# Patient Record
Sex: FEMALE | Race: WHITE
[De-identification: names, ages, dates, MRNs, and addresses within clinical notes are randomized per-mention and may not be internally consistent; named-entity substitution may affect disease eponyms.]

---

## 2018-08-31 ENCOUNTER — HOSPITAL ENCOUNTER (EMERGENCY)
Dept: HOSPITAL 54 - ER | Age: 43
Discharge: HOME | End: 2018-08-31
Payer: SELF-PAY

## 2018-08-31 VITALS
HEIGHT: 62 IN | WEIGHT: 110 LBS | BODY MASS INDEX: 20.24 KG/M2 | DIASTOLIC BLOOD PRESSURE: 75 MMHG | SYSTOLIC BLOOD PRESSURE: 142 MMHG

## 2018-08-31 DIAGNOSIS — N39.0: Primary | ICD-10-CM

## 2018-08-31 PROCEDURE — Z7610: HCPCS

## 2018-08-31 PROCEDURE — A4606 OXYGEN PROBE USED W OXIMETER: HCPCS

## 2018-08-31 NOTE — NUR
URINE SAMPLE OBTAINED. VS STABLE. /78. HR 89. R 18. O2 SAT 98% ON RA. Pt 
WAITING COMFORTABLY IN BED. NO S/S OF ACUTE DISTRESS OR SOB NOTED.

## 2020-07-27 ENCOUNTER — HOSPITAL ENCOUNTER (INPATIENT)
Dept: HOSPITAL 54 - ER | Age: 45
LOS: 3 days | Discharge: HOME | DRG: 233 | End: 2020-07-30
Attending: NURSE PRACTITIONER | Admitting: NURSE PRACTITIONER
Payer: COMMERCIAL

## 2020-07-27 VITALS — SYSTOLIC BLOOD PRESSURE: 117 MMHG | DIASTOLIC BLOOD PRESSURE: 70 MMHG

## 2020-07-27 VITALS — HEIGHT: 60 IN | WEIGHT: 122 LBS | BODY MASS INDEX: 23.95 KG/M2

## 2020-07-27 VITALS — DIASTOLIC BLOOD PRESSURE: 70 MMHG | SYSTOLIC BLOOD PRESSURE: 117 MMHG

## 2020-07-27 DIAGNOSIS — M32.9: ICD-10-CM

## 2020-07-27 DIAGNOSIS — K38.1: ICD-10-CM

## 2020-07-27 DIAGNOSIS — K35.32: Primary | ICD-10-CM

## 2020-07-27 DIAGNOSIS — M79.7: ICD-10-CM

## 2020-07-27 DIAGNOSIS — D25.9: ICD-10-CM

## 2020-07-27 DIAGNOSIS — N83.00: ICD-10-CM

## 2020-07-27 DIAGNOSIS — F17.200: ICD-10-CM

## 2020-07-27 LAB
ALBUMIN SERPL BCP-MCNC: 4 G/DL (ref 3.4–5)
ALP SERPL-CCNC: 70 U/L (ref 46–116)
ALT SERPL W P-5'-P-CCNC: 18 U/L (ref 12–78)
AST SERPL W P-5'-P-CCNC: 15 U/L (ref 15–37)
BASOPHILS # BLD AUTO: 0.1 /CMM (ref 0–0.2)
BASOPHILS NFR BLD AUTO: 0.4 % (ref 0–2)
BILIRUB DIRECT SERPL-MCNC: 0.1 MG/DL (ref 0–0.2)
BILIRUB SERPL-MCNC: 0.5 MG/DL (ref 0.2–1)
BILIRUB UR QL STRIP: (no result)
BUN SERPL-MCNC: 16 MG/DL (ref 7–18)
CALCIUM SERPL-MCNC: 8.8 MG/DL (ref 8.5–10.1)
CHLORIDE SERPL-SCNC: 100 MMOL/L (ref 98–107)
CO2 SERPL-SCNC: 26 MMOL/L (ref 21–32)
CREAT SERPL-MCNC: 0.7 MG/DL (ref 0.6–1.3)
EOSINOPHIL NFR BLD AUTO: 0.6 % (ref 0–6)
GLUCOSE SERPL-MCNC: 99 MG/DL (ref 74–106)
HCT VFR BLD AUTO: 41 % (ref 33–45)
HGB BLD-MCNC: 13.5 G/DL (ref 11.5–14.8)
KETONES UR STRIP-MCNC: 80 MG/DL
LIPASE SERPL-CCNC: 104 U/L (ref 73–393)
LYMPHOCYTES NFR BLD AUTO: 1.5 /CMM (ref 0.8–4.8)
LYMPHOCYTES NFR BLD AUTO: 10.9 % (ref 20–44)
MCHC RBC AUTO-ENTMCNC: 33 G/DL (ref 31–36)
MCV RBC AUTO: 94 FL (ref 82–100)
MONOCYTES NFR BLD AUTO: 0.9 /CMM (ref 0.1–1.3)
MONOCYTES NFR BLD AUTO: 6.8 % (ref 2–12)
NEUTROPHILS # BLD AUTO: 10.9 /CMM (ref 1.8–8.9)
NEUTROPHILS NFR BLD AUTO: 81.3 % (ref 43–81)
PH UR STRIP: 6.5 [PH] (ref 5–8)
PLATELET # BLD AUTO: 248 /CMM (ref 150–450)
POTASSIUM SERPL-SCNC: 4.2 MMOL/L (ref 3.5–5.1)
PROT SERPL-MCNC: 7.1 G/DL (ref 6.4–8.2)
RBC # BLD AUTO: 4.38 MIL/UL (ref 4–5.2)
RBC #/AREA URNS HPF: (no result) /HPF (ref 0–2)
SODIUM SERPL-SCNC: 136 MMOL/L (ref 136–145)
UROBILINOGEN UR STRIP-MCNC: 0.2 EU/DL
WBC #/AREA URNS HPF: (no result) /HPF (ref 0–3)
WBC NRBC COR # BLD AUTO: 13.4 K/UL (ref 4.3–11)

## 2020-07-27 PROCEDURE — 0DTJ4ZZ RESECTION OF APPENDIX, PERCUTANEOUS ENDOSCOPIC APPROACH: ICD-10-PCS | Performed by: SURGERY

## 2020-07-27 PROCEDURE — G0378 HOSPITAL OBSERVATION PER HR: HCPCS

## 2020-07-27 RX ADMIN — SODIUM CHLORIDE PRN MLS/HR: 9 INJECTION, SOLUTION INTRAVENOUS at 23:36

## 2020-07-27 NOTE — NUR
MS RN ADMISSION NOTES



RECEIVED PATIENT FROM ER VIA [facility], ALERT AND ORIENTED X 4. AMBULATORY AND VERBALLY 
RESPONSIVE. ABLE TO FOLLOW DIRECTIONS. BREATHING REGULAR AND UNLABORED ON ROOM AIR. LEFT AC 
G18 IV LINE INTACT AND PATENT, FLUSHING WELL. BODY ASSESSMENT DONE, SKIN INTACT CLEAN AND 
DRY. VITAL SIGNS AND BELONGINGS CHECKED BY CNA. DENIES SUICIDAL IDEATION OR PAIN/DISCOMFORT 
AT THIS TIME. ADVISED ON NOTHING BY MOUTH. DOESN'T HAVE ADVANCE DIRECTIVES AND WISHED TO BE 
FULL CODE. BED LOW AND LOCKED ON SEMI FOWLERS POSITION. CALL LIGHT IN REACH. WILL CONTINUE 
TO MONITOR.

## 2020-07-27 NOTE — NUR
PT BEING TRANSPORTED TO UNIT ON [facility] WITH EMT AT BEDSIDE. PT IS IN STABLE 
CONDITION FOR TRASPORT. NAD NOTED

## 2020-07-28 VITALS — SYSTOLIC BLOOD PRESSURE: 93 MMHG | DIASTOLIC BLOOD PRESSURE: 57 MMHG

## 2020-07-28 VITALS — SYSTOLIC BLOOD PRESSURE: 123 MMHG | DIASTOLIC BLOOD PRESSURE: 71 MMHG

## 2020-07-28 VITALS — SYSTOLIC BLOOD PRESSURE: 114 MMHG | DIASTOLIC BLOOD PRESSURE: 72 MMHG

## 2020-07-28 VITALS — SYSTOLIC BLOOD PRESSURE: 105 MMHG | DIASTOLIC BLOOD PRESSURE: 64 MMHG

## 2020-07-28 LAB
BASOPHILS # BLD AUTO: 0 /CMM (ref 0–0.2)
BASOPHILS NFR BLD AUTO: 0.1 % (ref 0–2)
BUN SERPL-MCNC: 9 MG/DL (ref 7–18)
CALCIUM SERPL-MCNC: 8.5 MG/DL (ref 8.5–10.1)
CHLORIDE SERPL-SCNC: 103 MMOL/L (ref 98–107)
CHOLEST SERPL-MCNC: 192 MG/DL (ref ?–200)
CO2 SERPL-SCNC: 23 MMOL/L (ref 21–32)
CREAT SERPL-MCNC: 0.6 MG/DL (ref 0.6–1.3)
EOSINOPHIL NFR BLD AUTO: 0.1 % (ref 0–6)
GLUCOSE SERPL-MCNC: 111 MG/DL (ref 74–106)
HCT VFR BLD AUTO: 38 % (ref 33–45)
HDLC SERPL-MCNC: 84 MG/DL (ref 40–60)
HGB BLD-MCNC: 12.5 G/DL (ref 11.5–14.8)
LDLC SERPL DIRECT ASSAY-MCNC: 106 MG/DL (ref 0–99)
LYMPHOCYTES NFR BLD AUTO: 1 /CMM (ref 0.8–4.8)
LYMPHOCYTES NFR BLD AUTO: 7.2 % (ref 20–44)
MAGNESIUM SERPL-MCNC: 2.1 MG/DL (ref 1.8–2.4)
MCHC RBC AUTO-ENTMCNC: 33 G/DL (ref 31–36)
MCV RBC AUTO: 94 FL (ref 82–100)
MONOCYTES NFR BLD AUTO: 0.9 /CMM (ref 0.1–1.3)
MONOCYTES NFR BLD AUTO: 6.3 % (ref 2–12)
NEUTROPHILS # BLD AUTO: 12.5 /CMM (ref 1.8–8.9)
NEUTROPHILS NFR BLD AUTO: 86.3 % (ref 43–81)
PHOSPHATE SERPL-MCNC: 3.7 MG/DL (ref 2.5–4.9)
PLATELET # BLD AUTO: 199 /CMM (ref 150–450)
POTASSIUM SERPL-SCNC: 4.2 MMOL/L (ref 3.5–5.1)
RBC # BLD AUTO: 4.05 MIL/UL (ref 4–5.2)
SODIUM SERPL-SCNC: 136 MMOL/L (ref 136–145)
TRIGL SERPL-MCNC: 23 MG/DL (ref 30–150)
WBC NRBC COR # BLD AUTO: 14.5 K/UL (ref 4.3–11)

## 2020-07-28 RX ADMIN — PIPERACILLIN SODIUM AND TAZOBACTAM SODIUM SCH MLS/HR: .375; 3 INJECTION, POWDER, LYOPHILIZED, FOR SOLUTION INTRAVENOUS at 17:19

## 2020-07-28 RX ADMIN — HYDROMORPHONE HYDROCHLORIDE PRN MG: 1 INJECTION, SOLUTION INTRAMUSCULAR; INTRAVENOUS; SUBCUTANEOUS at 13:54

## 2020-07-28 RX ADMIN — SODIUM CHLORIDE PRN MLS/HR: 9 INJECTION, SOLUTION INTRAVENOUS at 10:44

## 2020-07-28 RX ADMIN — HYDROMORPHONE HYDROCHLORIDE PRN MG: 1 INJECTION, SOLUTION INTRAMUSCULAR; INTRAVENOUS; SUBCUTANEOUS at 09:43

## 2020-07-28 RX ADMIN — HYDROMORPHONE HYDROCHLORIDE PRN MG: 1 INJECTION, SOLUTION INTRAMUSCULAR; INTRAVENOUS; SUBCUTANEOUS at 04:46

## 2020-07-28 RX ADMIN — PIPERACILLIN SODIUM AND TAZOBACTAM SODIUM SCH MLS/HR: .375; 3 INJECTION, POWDER, LYOPHILIZED, FOR SOLUTION INTRAVENOUS at 10:50

## 2020-07-28 NOTE — NUR
RN OPENING NOTE

RECEIVED PT A/O X 4, PT SITTING UP IN BED WITH BLE DVT PUMPS ON. DENIES PAIN OR DISCOMFORT 
AT THIS TIME. IV TO LAC PATENT AND INTACT FLUSHING WELL. NS INFUSING  ML/HR. PT 
AMBULATED TO RESTROOM WITH NO ASSISTANCE, WILL CONTINUE TO MONITOR PT.

## 2020-07-28 NOTE — NUR
m/s lvn: notes



heather (recovery nurse) here to  the pt via bed with chart. zosyn ivpb given to heather 
(recovery nurse) to administer after surgery.

## 2020-07-28 NOTE — NUR
MS RN NOTES



COMPLAINED OF 8/10 ABDOMINAL PAIN, DILAUDID 1MG GIVEN IV PUSH. NON-PHARMACOLOGICAL 
INTERVENTIONS PROVIDED. VITAL SIGNS WNL. WILL CONTINUE TO MONITOR.

## 2020-07-28 NOTE — NUR
m/s lvn: notes



pt c/o n/v, medicated with zofran 4mg ivp by rn. pt remains npo and scheduled for surgery at 
1600.

## 2020-07-28 NOTE — NUR
m/s lvn: notes



received pt from recovery with dx: s/p lap appy and washout. incision to abdomen with 
dermabond. no drainage/discharge noted. no c/o sob or any discomfort at this time. iv fluids 
connected. clear liquid diet provided. hob elevated. needs attended. instructed to call for 
assistance. will continue to monitor.

## 2020-07-28 NOTE — NUR
m/s lvn: notes



c/o 8/10 abdominal pain, medicated with dilaudid 1mg ivp by rn. instructed to call for 
assistance. will continue to monitor.

## 2020-07-28 NOTE — NUR
m/s lvn: notes



c/o 10/10 abdominal pain, medicated with dilaudid 1mg ivp by rn. will continue to monitor.

## 2020-07-28 NOTE — NUR
m/s lvn: notes



ice pack and incentive spirometry provided with teachings, pt verbalized understanding.

## 2020-07-28 NOTE — NUR
m/s lvn: notes



consent obtain for  <Laparoscopic appendectomy possible open, possible any other indicated, 
procedure, anesthesia, and blood products. pt verbalized understanding of procedure. pt 
remains npo.

## 2020-07-28 NOTE — NUR
MS RN CLOSING NOTES



PATIENT IN BED ALERT AND ORIENTED X 4. AFEBRILE WITH NO S/S OF DISTRESS OBSERVED. LEFT AC 
G18 IV LINE PATENT AND INFUSING WELL. NO COMPLAINTS OF PAIN/DISCOMFORT REPORTED AT THIS 
TIME. MAINTAINED NPO. BED LOW AND LOCKED ON SEMI FOWLERS POSITION. CALL LIGHT IN REACH. WILL 
ENDORSE TO MORNING SHIFT FOR DINORA.

## 2020-07-28 NOTE — NUR
m/s lvn: surgeon consult



meri (np) at bedside and explained the procedure involve and risks, pt verbalized 
understanding. per np, she will put the order in. pt scheduled at 1600 as stated.

## 2020-07-28 NOTE — NUR
m/s lvn: initial assessment



received pt in bed awake, a/ox4. remains npo. iv fluids infusing. no c/o pain at this time. 
instructed to call for assistance. will continue to monitor.

## 2020-07-29 VITALS — SYSTOLIC BLOOD PRESSURE: 116 MMHG | DIASTOLIC BLOOD PRESSURE: 75 MMHG

## 2020-07-29 VITALS — SYSTOLIC BLOOD PRESSURE: 110 MMHG | DIASTOLIC BLOOD PRESSURE: 65 MMHG

## 2020-07-29 VITALS — DIASTOLIC BLOOD PRESSURE: 66 MMHG | SYSTOLIC BLOOD PRESSURE: 103 MMHG

## 2020-07-29 LAB
BASOPHILS # BLD AUTO: 0 /CMM (ref 0–0.2)
BASOPHILS NFR BLD AUTO: 0.3 % (ref 0–2)
BUN SERPL-MCNC: 5 MG/DL (ref 7–18)
CALCIUM SERPL-MCNC: 8.4 MG/DL (ref 8.5–10.1)
CHLORIDE SERPL-SCNC: 108 MMOL/L (ref 98–107)
CO2 SERPL-SCNC: 26 MMOL/L (ref 21–32)
CREAT SERPL-MCNC: 0.6 MG/DL (ref 0.6–1.3)
EOSINOPHIL NFR BLD AUTO: 1 % (ref 0–6)
GLUCOSE SERPL-MCNC: 102 MG/DL (ref 74–106)
HCT VFR BLD AUTO: 34 % (ref 33–45)
HGB BLD-MCNC: 11.1 G/DL (ref 11.5–14.8)
LYMPHOCYTES NFR BLD AUTO: 1.5 /CMM (ref 0.8–4.8)
LYMPHOCYTES NFR BLD AUTO: 22.5 % (ref 20–44)
MAGNESIUM SERPL-MCNC: 2.1 MG/DL (ref 1.8–2.4)
MCHC RBC AUTO-ENTMCNC: 33 G/DL (ref 31–36)
MCV RBC AUTO: 95 FL (ref 82–100)
MONOCYTES NFR BLD AUTO: 0.6 /CMM (ref 0.1–1.3)
MONOCYTES NFR BLD AUTO: 9.1 % (ref 2–12)
NEUTROPHILS # BLD AUTO: 4.6 /CMM (ref 1.8–8.9)
NEUTROPHILS NFR BLD AUTO: 67.1 % (ref 43–81)
PHOSPHATE SERPL-MCNC: 2.2 MG/DL (ref 2.5–4.9)
PLATELET # BLD AUTO: 170 /CMM (ref 150–450)
POTASSIUM SERPL-SCNC: 4.3 MMOL/L (ref 3.5–5.1)
RBC # BLD AUTO: 3.59 MIL/UL (ref 4–5.2)
SODIUM SERPL-SCNC: 141 MMOL/L (ref 136–145)
WBC NRBC COR # BLD AUTO: 6.9 K/UL (ref 4.3–11)

## 2020-07-29 RX ADMIN — PIPERACILLIN SODIUM AND TAZOBACTAM SODIUM SCH MLS/HR: .375; 3 INJECTION, POWDER, LYOPHILIZED, FOR SOLUTION INTRAVENOUS at 05:55

## 2020-07-29 RX ADMIN — PIPERACILLIN SODIUM AND TAZOBACTAM SODIUM SCH MLS/HR: .375; 3 INJECTION, POWDER, LYOPHILIZED, FOR SOLUTION INTRAVENOUS at 11:35

## 2020-07-29 RX ADMIN — PIPERACILLIN SODIUM AND TAZOBACTAM SODIUM SCH MLS/HR: .375; 3 INJECTION, POWDER, LYOPHILIZED, FOR SOLUTION INTRAVENOUS at 00:01

## 2020-07-29 RX ADMIN — Medication SCH MG: at 09:50

## 2020-07-29 RX ADMIN — PIPERACILLIN SODIUM AND TAZOBACTAM SODIUM SCH MLS/HR: .375; 3 INJECTION, POWDER, LYOPHILIZED, FOR SOLUTION INTRAVENOUS at 17:06

## 2020-07-29 NOTE — NUR
MS/RN OPENING NOTES



Patient awake resting in bed, A&O x 4. Denies any pain/discomfort noted at this time. 
Breathing even and non-labored on room air. No respiratory or cardiac distress noted. IV 
access on LAC #18, patent and intact, and running NS @100 ml/hr. No 
infiltration/infection/bleeding noted on site. Patient is ambulatory, with steady gait. 
Instructed patient to still use call light if in need of assistance getting up. Sensation 
from all peripheral extremities intact. Fall precautions maintained. Will continue current 
medical management.

## 2020-07-29 NOTE — NUR
RN NOTES



RECEIVED PATIENT AWAKE ALERT ORIENTED X4, AMBULATORY, DENIES PAIN OR DISCOMFORT AT THIS 
TIME. SAFETY MEASURES IN PLACE, CALL LIGHT WITHIN EASY REACH, PATIENT IS WEARING SLIP 
RESISTANT SOCKS. ALL NEEDS ANTICIPATED, KEEP CLEAN WARM DRY AND COMFORTABLE. WILL CONTINUE 
TO MONITOR ACCORDINGLY.

## 2020-07-29 NOTE — NUR
MS/RN CLOSING NOTES



Patient in bed, A&O x 4. No complaints of pain/discomfort noted throughout the shift. 
Breathing even and non-labored on room air, no SOB noted. No respiratory or cardiac distress 
noted. IV access on LAC #18, patent and intact, saline locked, patient refused to infuse 
fluids. No infiltration/infection/bleeding noted on site. Instructed patient to still use 
call light if in need of assistance getting up. Patient reports to have been passing gas, 
but no BM yet. Sensation from all peripheral extremities intact. Fall precautions 
maintained. Will endorse to night shift nurse.

## 2020-07-29 NOTE — NUR
RN CLOSING NOTE

PT SITTING UP IN BED AWAKE,  IV TO LAC PATENT AND INTACT FLUSHING WELL. NS INFUSING  
ML/HR. PT AMBULATED TO RESTROOM X 3 WITH NO ASSISTANCE DURING SHIFT. TOLERATED WELL. NO C/O 
PAIN THROUGHOUT SHIFT  NO DRAINAGE TO INCISION. TOLERATED CLEAR LIQUIDS. SIDE RAILS UP X 2, 
CALL LIGHT WITHIN REACH. ENDORSED TO AM RN FOR DINORA

## 2020-07-30 VITALS — SYSTOLIC BLOOD PRESSURE: 108 MMHG | DIASTOLIC BLOOD PRESSURE: 71 MMHG

## 2020-07-30 LAB
BASOPHILS # BLD AUTO: 0 /CMM (ref 0–0.2)
BASOPHILS NFR BLD AUTO: 0.6 % (ref 0–2)
BUN SERPL-MCNC: 8 MG/DL (ref 7–18)
CALCIUM SERPL-MCNC: 8 MG/DL (ref 8.5–10.1)
CHLORIDE SERPL-SCNC: 108 MMOL/L (ref 98–107)
CO2 SERPL-SCNC: 26 MMOL/L (ref 21–32)
CREAT SERPL-MCNC: 0.6 MG/DL (ref 0.6–1.3)
EOSINOPHIL NFR BLD AUTO: 2.4 % (ref 0–6)
FERRITIN SERPL-MCNC: 173 NG/ML (ref 8–388)
GLUCOSE SERPL-MCNC: 106 MG/DL (ref 74–106)
HCT VFR BLD AUTO: 31 % (ref 33–45)
HGB BLD-MCNC: 10.4 G/DL (ref 11.5–14.8)
IRON SERPL-MCNC: 21 UG/DL (ref 50–175)
LYMPHOCYTES NFR BLD AUTO: 1.6 /CMM (ref 0.8–4.8)
LYMPHOCYTES NFR BLD AUTO: 36.7 % (ref 20–44)
MAGNESIUM SERPL-MCNC: 1.7 MG/DL (ref 1.8–2.4)
MCHC RBC AUTO-ENTMCNC: 33 G/DL (ref 31–36)
MCV RBC AUTO: 93 FL (ref 82–100)
MONOCYTES NFR BLD AUTO: 0.4 /CMM (ref 0.1–1.3)
MONOCYTES NFR BLD AUTO: 10.1 % (ref 2–12)
NEUTROPHILS # BLD AUTO: 2.2 /CMM (ref 1.8–8.9)
NEUTROPHILS NFR BLD AUTO: 50.2 % (ref 43–81)
PHOSPHATE SERPL-MCNC: 3.6 MG/DL (ref 2.5–4.9)
PLATELET # BLD AUTO: 159 /CMM (ref 150–450)
POTASSIUM SERPL-SCNC: 3.6 MMOL/L (ref 3.5–5.1)
RBC # BLD AUTO: 3.34 MIL/UL (ref 4–5.2)
SODIUM SERPL-SCNC: 142 MMOL/L (ref 136–145)
TIBC SERPL-MCNC: 145 UG/DL (ref 250–450)
WBC NRBC COR # BLD AUTO: 4.4 K/UL (ref 4.3–11)

## 2020-07-30 RX ADMIN — PIPERACILLIN SODIUM AND TAZOBACTAM SODIUM SCH MLS/HR: .375; 3 INJECTION, POWDER, LYOPHILIZED, FOR SOLUTION INTRAVENOUS at 06:17

## 2020-07-30 RX ADMIN — PIPERACILLIN SODIUM AND TAZOBACTAM SODIUM SCH MLS/HR: .375; 3 INJECTION, POWDER, LYOPHILIZED, FOR SOLUTION INTRAVENOUS at 00:01

## 2020-07-30 RX ADMIN — Medication SCH MG: at 08:34

## 2020-07-30 RX ADMIN — PIPERACILLIN SODIUM AND TAZOBACTAM SODIUM SCH MLS/HR: .375; 3 INJECTION, POWDER, LYOPHILIZED, FOR SOLUTION INTRAVENOUS at 11:17

## 2020-07-30 NOTE — NUR
MS/RN OPENING NOTES



Received patient awake sitting on bed, A&O x 4. Breathing even and non-labored on room air, 
no SOB noted. No cardiac distress noted. Denies pain/discomfort at this time.  IV access on 
LAC #18, patent and intact, and flushing well. Refuses to receive fluids. No 
infiltration/infection/bleeding noted on site. Patient ambulates, still instructed patient 
to still use call light if in need of assistance getting up. Sensation from all peripheral 
extremities intact. Fall precautions maintained. Will continue current plan of care.

## 2020-07-30 NOTE — NUR
MS/RN NOTES



Removed patient's IV with catheter intact, no bleeding/infection/infiltration noted. Dr. Arrington at bedside, states "stop mag and discontinue IV since she's going to get discharge in 
an hour." Orders carried out.

## 2020-07-30 NOTE — NUR
RN NOTES



ALL NEEDS ATTENDED AND MET ABLE TO REST AND SLEPT AT INTERVALS, KEPT CLEAN WARM DRY AND 
COMFORTABLE, SAFETY MEASURES IN PLACE, ABLE TO AMBULATE FREELY, WALKING THE HALLWAY 3 TIMES, 
NO COMPLAINTS OF PAIN, DISCOMFORT TOLERABLE. ABLE TO PASS GAS THROUGHOUT THE NIGHT. WILL 
ENDORSE TO AM NURSE FOR CONTINUITY OF CARE.

## 2020-07-30 NOTE — NUR
MS/RN DISCHARGE NOTES



Patient picked up by sonJake, in a private car. Patient remained stable, VSS, A&O x 4. 
Breathing even and non-labored on RA. No respiratory or cardiac distress noted. Removed IV 
with catheter intact, placed clean dry dressing on the site. No s/s of bleeding, 
infiltration, infection noted. Sensation from all peripheral extremities intact. Skin is 
intact, photos of abdominal incisions and IV site taken and placed on chart. Explained 
discharge instructions to patient, patient verbalizes understanding. Patient left facility 
safely with discharge instructions, hospitalization documents, and belongings in hand.
